# Patient Record
Sex: FEMALE | Race: OTHER | NOT HISPANIC OR LATINO | URBAN - METROPOLITAN AREA
[De-identification: names, ages, dates, MRNs, and addresses within clinical notes are randomized per-mention and may not be internally consistent; named-entity substitution may affect disease eponyms.]

---

## 2018-05-05 ENCOUNTER — EMERGENCY (EMERGENCY)
Facility: HOSPITAL | Age: 43
LOS: 1 days | Discharge: HOME | End: 2018-05-05
Attending: EMERGENCY MEDICINE | Admitting: EMERGENCY MEDICINE
Payer: COMMERCIAL

## 2018-05-05 VITALS
SYSTOLIC BLOOD PRESSURE: 111 MMHG | RESPIRATION RATE: 14 BRPM | TEMPERATURE: 98 F | HEIGHT: 66 IN | HEART RATE: 74 BPM | WEIGHT: 134.92 LBS | DIASTOLIC BLOOD PRESSURE: 65 MMHG | OXYGEN SATURATION: 98 %

## 2018-05-05 DIAGNOSIS — R06.02 SHORTNESS OF BREATH: ICD-10-CM
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
## 2018-05-05 DIAGNOSIS — M54.9 DORSALGIA, UNSPECIFIED: ICD-10-CM
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
## 2018-05-05 DIAGNOSIS — S39.012A STRAIN OF MUSCLE, FASCIA AND TENDON OF LOWER BACK, INITIAL ENCOUNTER: ICD-10-CM

## 2018-05-05 DIAGNOSIS — Z88.8 ALLERGY STATUS TO OTHER DRUGS, MEDICAMENTS AND BIOLOGICAL SUBSTANCES: ICD-10-CM
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
## 2018-05-05 PROCEDURE — 99283 EMERGENCY DEPT VISIT LOW MDM: CPT

## 2018-05-05 RX ORDER — IBUPROFEN 200 MG
1 TABLET ORAL
Qty: 20 | Refills: 0 | OUTPATIENT
Start: 2018-05-05 | End: 2018-05-09

## 2018-05-05 RX ORDER — KETOROLAC TROMETHAMINE 30 MG/ML
60 SYRINGE (ML) INJECTION ONCE
Qty: 0 | Refills: 0 | Status: DISCONTINUED | OUTPATIENT
Start: 2018-05-05 | End: 2018-05-05

## 2018-05-05 RX ORDER — METHOCARBAMOL 500 MG/1
1 TABLET, FILM COATED ORAL
Qty: 15 | Refills: 0 | OUTPATIENT
Start: 2018-05-05 | End: 2018-05-09

## 2018-05-05 RX ADMIN — Medication 60 MILLIGRAM(S): at 17:46

## 2018-05-05 RX ADMIN — Medication 60 MILLIGRAM(S): at 17:44

## 2018-05-05 NOTE — ED ADULT TRIAGE NOTE - CHIEF COMPLAINT QUOTE
ambulated to ed with steady gait c/o x1 day of lower right sided back pain. took motrin/tylenol ineffective. denies any numbness/tingling, no loss of bladder/bowel.

## 2018-05-05 NOTE — ED PROVIDER NOTE - MUSCULOSKELETAL, MLM
Tenderness in right lateral lumbar paraspinal area, limited ROM due to pain, no midline tenderness, no step offs.

## 2018-05-05 NOTE — ED PROVIDER NOTE - MEDICAL DECISION MAKING DETAILS
43 y/o Female presenting w . Will give Valium, Toradol and heating pack. Will treat symptomatically with anti- inflammatory muscle relaxer, gave warm compress for pain and will re-assess.

## 2018-05-05 NOTE — ED PROVIDER NOTE - OBJECTIVE STATEMENT
43 y/o Female presents to the ED c/o of right lateral lower back pain. Pt states that she took a barre class yesterday which exacerbated the pain. Pt notes lower back pain worsening with movement, and some sob but denies LOC, chest pain and  abdominal pain. Pt took Motrin, Tylenol and Ibuprofen for pain, last Ibuprofen was taken early this morning. Pt walked to the ED. Pt had her LNMP a few weeks ago and takes birth control daily.

## 2019-03-31 ENCOUNTER — EMERGENCY (EMERGENCY)
Facility: HOSPITAL | Age: 44
LOS: 1 days | Discharge: HOME | End: 2019-03-31
Attending: EMERGENCY MEDICINE | Admitting: EMERGENCY MEDICINE
Payer: COMMERCIAL

## 2019-03-31 VITALS
HEIGHT: 64 IN | TEMPERATURE: 97 F | WEIGHT: 139.99 LBS | SYSTOLIC BLOOD PRESSURE: 145 MMHG | RESPIRATION RATE: 16 BRPM | DIASTOLIC BLOOD PRESSURE: 62 MMHG

## 2019-03-31 VITALS
OXYGEN SATURATION: 97 % | HEART RATE: 77 BPM | DIASTOLIC BLOOD PRESSURE: 77 MMHG | RESPIRATION RATE: 16 BRPM | SYSTOLIC BLOOD PRESSURE: 111 MMHG

## 2019-03-31 DIAGNOSIS — F41.9 ANXIETY DISORDER, UNSPECIFIED: ICD-10-CM

## 2019-03-31 DIAGNOSIS — Z79.899 OTHER LONG TERM (CURRENT) DRUG THERAPY: ICD-10-CM

## 2019-03-31 DIAGNOSIS — Z88.6 ALLERGY STATUS TO ANALGESIC AGENT: ICD-10-CM

## 2019-03-31 DIAGNOSIS — F43.25 ADJUSTMENT DISORDER WITH MIXED DISTURBANCE OF EMOTIONS AND CONDUCT: ICD-10-CM

## 2019-03-31 PROCEDURE — 99283 EMERGENCY DEPT VISIT LOW MDM: CPT

## 2019-03-31 RX ORDER — ACETAMINOPHEN 500 MG
650 TABLET ORAL ONCE
Qty: 0 | Refills: 0 | Status: COMPLETED | OUTPATIENT
Start: 2019-03-31 | End: 2019-03-31

## 2019-03-31 RX ORDER — CLONAZEPAM 1 MG
1 TABLET ORAL
Qty: 6 | Refills: 0 | OUTPATIENT
Start: 2019-03-31 | End: 2019-04-01

## 2019-03-31 RX ORDER — CLONAZEPAM 1 MG
0.5 TABLET ORAL ONCE
Qty: 0 | Refills: 0 | Status: DISCONTINUED | OUTPATIENT
Start: 2019-03-31 | End: 2019-03-31

## 2019-03-31 RX ADMIN — Medication 0.5 MILLIGRAM(S): at 18:04

## 2019-03-31 RX ADMIN — Medication 650 MILLIGRAM(S): at 18:55

## 2019-03-31 NOTE — ED PROVIDER NOTE - PSYCHIATRIC, MLM
AOx3, emotionally labile, can offer clear well formulated thoughts, very reasonable and intelligent, denies SI/HI.

## 2019-03-31 NOTE — ED PROVIDER NOTE - OBJECTIVE STATEMENT
42 yo healthy F presenting with feelings of stress and anxiety, being overwhelmed given recent divorce and encountering ex- today, bringing on additional stress and emotions.  Pt denies SI, HI, hx of drug or ETOH abuse.  Was taking Klonopin in past but no longer.

## 2019-03-31 NOTE — ED ADULT NURSE NOTE - CHIEF COMPLAINT QUOTE
walk in c/o anxiety worsening over 2days, tearful, denies SI or HI. Denies CP or SOB, + hx of similar many years ago, not currently on any medications. Denies etoh or drug use

## 2019-03-31 NOTE — ED PROVIDER NOTE - PROGRESS NOTE DETAILS
given 0.5 mg clonazepam and remained very emotionally stable.  desires discharge.  plan dc and outpt fu.

## 2019-03-31 NOTE — ED PROVIDER NOTE - CONSTITUTIONAL, MLM
normal... Well appearing, well nourished, awake, alert, oriented to person, place, time/situation and emotionally distressed at times and crying intermittently.

## 2019-03-31 NOTE — ED PROVIDER NOTE - NSFOLLOWUPCLINICS_GEN_ALL_ED_FT
[facility]  Psychiatry  [address] [place] [address]  Phone: [phone]  Fax:   Follow Up Time: 1-3 Days

## 2019-03-31 NOTE — ED PROVIDER NOTE - NSFOLLOWUPINSTRUCTIONS_ED_ALL_ED_FT
Anxiety    Generalized anxiety disorder (JAYA) is a mental disorder. It is defined as anxiety that is not necessarily related to specific events or activities or is out of proportion to said events. Symptoms include restlessness, fatigue, difficulty concentrations, irritability and difficulty concentrating. It may interfere with life functions, including relationships, work, and school. If you were started on a medication, make sure to take exactly as prescribed and follow up with a psychiatrist.    SEEK IMMEDIATE MEDICAL CARE IF YOU HAVE ANY OF THE FOLLOWING SYMPTOMS: thoughts about hurting killing yourself, thoughts about hurting or killing somebody else, hallucinations, or worsening depression.

## 2019-03-31 NOTE — ED ADULT TRIAGE NOTE - PRO INTERPRETER NEED 2
Pt here with periumbilical abdominal pain since 1500 this afternoon, no fevers, denies vomiting or diarrhea, pt reports two bowel movements today that were harder to pass.
English

## 2019-03-31 NOTE — ED PROVIDER NOTE - CLINICAL SUMMARY MEDICAL DECISION MAKING FREE TEXT BOX
Pt presenting to ED for acute worsening of emotional lability due to recent adjustment with divorce.  No thoughts of SI/HI.  Pt not abusing drugs or medications.  Demonstrates very clear and reasonable thought process and reliability.  Encouraged multiple non-pharm stress relieving techniques which patient agrees to attempt.  Given acute need will give low dose clonazepam (which patient has been on in past) and dc with fu with her therapist and additional psychiatry follow up.  Pt shows no signs or concerns for harm to self or others.

## 2019-03-31 NOTE — ED ADULT NURSE NOTE - PMH
Problem: Nutritional:  Goal: Patient to verbalize or demonstrate understanding of diet  Outcome: MET Date Met: 01/05/18  Provided cardiac diet education explanation and handout to wife.       No pertinent past medical history

## 2019-04-24 ENCOUNTER — EMERGENCY (EMERGENCY)
Facility: HOSPITAL | Age: 44
LOS: 1 days | Discharge: HOME | End: 2019-04-24
Admitting: EMERGENCY MEDICINE
Payer: COMMERCIAL

## 2019-04-24 VITALS
HEART RATE: 66 BPM | OXYGEN SATURATION: 98 % | DIASTOLIC BLOOD PRESSURE: 76 MMHG | SYSTOLIC BLOOD PRESSURE: 118 MMHG | RESPIRATION RATE: 16 BRPM

## 2019-04-24 VITALS
TEMPERATURE: 98 F | DIASTOLIC BLOOD PRESSURE: 83 MMHG | SYSTOLIC BLOOD PRESSURE: 130 MMHG | RESPIRATION RATE: 18 BRPM | HEART RATE: 99 BPM | OXYGEN SATURATION: 99 %

## 2019-04-24 PROCEDURE — 99284 EMERGENCY DEPT VISIT MOD MDM: CPT | Mod: 25

## 2019-04-24 PROCEDURE — 93010 ELECTROCARDIOGRAM REPORT: CPT

## 2019-04-24 RX ORDER — CLONAZEPAM 1 MG
0.5 TABLET ORAL ONCE
Qty: 0 | Refills: 0 | Status: DISCONTINUED | OUTPATIENT
Start: 2019-04-24 | End: 2019-04-24

## 2019-04-24 RX ADMIN — Medication 0.5 MILLIGRAM(S): at 12:28

## 2019-04-24 NOTE — ED PROVIDER NOTE - OBJECTIVE STATEMENT
43 y.o F with no PMHx presents to ED with complaints of panic attacks. Pt reports feeling palpitations, and finger tingling. Pt was last seen 3 weeks ago for same complaint. Pt describes no Hx of anxiety, but admits she has been under stress due to a divorce. Pt also reports meeting with her ex- this morning, causing increased stress, as well as the symptoms. Pt was given Clonopin during her last visit. She was referred to psychiatry but was told they are not accepting new Pt's. She did not attempt to follow up as "she thought it would just go away".

## 2019-04-24 NOTE — ED ADULT NURSE NOTE - OBJECTIVE STATEMENT
Pt with c/o anxiety, palpitations and tingling in hands bilaterally.   Reports going through a lot of personal changes

## 2019-04-24 NOTE — ED ADULT TRIAGE NOTE - CHIEF COMPLAINT QUOTE
pt states she doesn't have anxiety but was seen here previously for anxiety. Complaints of palpitations

## 2019-04-24 NOTE — ED PROVIDER NOTE - CLINICAL SUMMARY MEDICAL DECISION MAKING FREE TEXT BOX
pt presents c/o panic attack. pt seen for similar 3 weeks ago and given klonopin. pt unable to find a psychiatrist to follow up with. pt given one dose of klonopin in ER and symptoms resolved. SW provided pt with list of psychiatrists and psychologists for her to set up follow up appointment. will d/c.

## 2019-04-24 NOTE — ED PROVIDER NOTE - PROGRESS NOTE DETAILS
pt reports she is feeling improved after klonopin and is requesting discharge to go check on her dog. will d/c. pt requested klonopin rx but explained as this is a controlled substance and requires follow up, I cannot give that to her, however, I involved our SW Florence Strauss who has provided the patient with a list of psychologists and psychiatrists that she can follow up with.

## 2019-04-24 NOTE — ED ADULT NURSE NOTE - CHPI ED NUR SYMPTOMS NEG
no syncope/no fever/no diaphoresis/no chills/no vomiting/no chest pain/no back pain/no nausea/no congestion/no dizziness

## 2019-04-24 NOTE — ED ADULT NURSE NOTE - NSIMPLEMENTINTERV_GEN_ALL_ED
Implemented All Universal Safety Interventions:  [place] to call system. Call bell, personal items and telephone within reach. Instruct patient to call for assistance. Room bathroom lighting operational. Non-slip footwear when patient is off stretcher. Physically safe environment: no spills, clutter or unnecessary equipment. Stretcher in lowest position, wheels locked, appropriate side rails in place.

## 2019-04-24 NOTE — ED PROVIDER NOTE - NSFOLLOWUPINSTRUCTIONS_ED_ALL_ED_FT
FOLLOW UP WITH ONE OF THE PSYCHIATRISTS AND/OR PSYCHOLOGISTS THAT THE  HAS PROVIDED YOU WITH.     FOLLOW UP WITH YOUR PMD. CALL TODAY TO SCHEDULE AN APPOINTMENT, ESPECIALLY IF YOU THINK YOU NEED PRESCRIPTIONS.     RETURN TO ER FOR ANY NEW OR CONCERNING SYMPTOMS INCLUDING CHEST PAIN, SHORTNESS OF BREATH, SWEATING, DIZZINESS, LOSS OF CONSCIOUSNESS, ANY OTHER NEW OR CONCERNING SYMPTOMS.     Anxiety    Generalized anxiety disorder (JAYA) is a mental disorder. It is defined as anxiety that is not necessarily related to specific events or activities or is out of proportion to said events. Symptoms include restlessness, fatigue, difficulty concentrations, irritability and difficulty concentrating. It may interfere with life functions, including relationships, work, and school. If you were started on a medication, make sure to take exactly as prescribed and follow up with a psychiatrist.    SEEK IMMEDIATE MEDICAL CARE IF YOU HAVE ANY OF THE FOLLOWING SYMPTOMS: thoughts about hurting killing yourself, thoughts about hurting or killing somebody else, hallucinations, or worsening depression.

## 2019-04-28 DIAGNOSIS — F41.9 ANXIETY DISORDER, UNSPECIFIED: ICD-10-CM

## 2019-04-28 DIAGNOSIS — Z88.6 ALLERGY STATUS TO ANALGESIC AGENT: ICD-10-CM

## 2019-04-28 DIAGNOSIS — R20.2 PARESTHESIA OF SKIN: ICD-10-CM

## 2019-04-28 DIAGNOSIS — Z79.899 OTHER LONG TERM (CURRENT) DRUG THERAPY: ICD-10-CM

## 2025-03-13 NOTE — ED ADULT NURSE NOTE - NSSISCREENINGQ5_ED_A_ED
No